# Patient Record
Sex: FEMALE | Race: WHITE
[De-identification: names, ages, dates, MRNs, and addresses within clinical notes are randomized per-mention and may not be internally consistent; named-entity substitution may affect disease eponyms.]

---

## 2017-09-04 ENCOUNTER — HOSPITAL ENCOUNTER (OUTPATIENT)
Dept: HOSPITAL 10 - L-D | Age: 27
Discharge: HOME | End: 2017-09-04
Attending: OBSTETRICS & GYNECOLOGY
Payer: MEDICAID

## 2017-09-04 VITALS
HEIGHT: 61 IN | BODY MASS INDEX: 28.68 KG/M2 | WEIGHT: 151.9 LBS | HEIGHT: 61 IN | BODY MASS INDEX: 28.68 KG/M2 | WEIGHT: 151.9 LBS

## 2017-09-04 VITALS — DIASTOLIC BLOOD PRESSURE: 62 MMHG | HEART RATE: 18 BPM | SYSTOLIC BLOOD PRESSURE: 118 MMHG | RESPIRATION RATE: 18 BRPM

## 2017-09-04 DIAGNOSIS — Z3A.36: ICD-10-CM

## 2017-09-04 DIAGNOSIS — D64.9: ICD-10-CM

## 2017-09-04 DIAGNOSIS — O99.013: Primary | ICD-10-CM

## 2017-09-04 DIAGNOSIS — O26.893: ICD-10-CM

## 2017-09-04 DIAGNOSIS — R51: ICD-10-CM

## 2017-09-04 LAB
ADD UMIC: YES
ALBUMIN SERPL-MCNC: 2.7 G/DL (ref 3.3–4.9)
ALBUMIN/GLOB SERPL: 0.96 {RATIO}
ALP SERPL-CCNC: 153 IU/L (ref 42–121)
ALT SERPL-CCNC: 23 IU/L (ref 13–69)
ANION GAP SERPL CALC-SCNC: 10 MMOL/L (ref 8–16)
APTT BLD: 25.4 SEC (ref 25–35)
AST SERPL-CCNC: 13 IU/L (ref 15–46)
BACTERIA #/AREA URNS HPF: (no result) /HPF
BASOPHILS # BLD AUTO: 0 10^3/UL (ref 0–0.1)
BASOPHILS NFR BLD: 0.7 % (ref 0–2)
BILIRUB DIRECT SERPL-MCNC: 0 MG/DL (ref 0–0.2)
BILIRUB SERPL-MCNC: 0 MG/DL (ref 0.2–1.3)
BUN SERPL-MCNC: 8 MG/DL (ref 7–20)
CALCIUM SERPL-MCNC: 8.2 MG/DL (ref 8.4–10.2)
CHLORIDE SERPL-SCNC: 108 MMOL/L (ref 97–110)
CO2 SERPL-SCNC: 21 MMOL/L (ref 21–31)
COLOR UR: YELLOW
CREAT SERPL-MCNC: 0.47 MG/DL (ref 0.44–1)
EOSINOPHIL # BLD: 0 10^3/UL (ref 0–0.5)
EOSINOPHIL NFR BLD: 0.7 % (ref 0–7)
ERYTHROCYTE [DISTWIDTH] IN BLOOD BY AUTOMATED COUNT: 14.3 % (ref 11.5–14.5)
GLOBULIN SER-MCNC: 2.8 G/DL (ref 1.3–3.2)
GLUCOSE SERPL-MCNC: 76 MG/DL (ref 70–220)
GLUCOSE UR STRIP-MCNC: NEGATIVE MG/DL
HCT VFR BLD CALC: 24.8 % (ref 37–47)
HGB BLD-MCNC: 8 G/DL (ref 12–16)
INR PPP: 0.93
KETONES UR STRIP.AUTO-MCNC: NEGATIVE MG/DL
LYMPHOCYTES # BLD AUTO: 1.7 10^3/UL (ref 0.8–2.9)
LYMPHOCYTES NFR BLD AUTO: 29.2 % (ref 15–51)
MCH RBC QN AUTO: 25.8 PG (ref 29–33)
MCHC RBC AUTO-ENTMCNC: 32.3 G/DL (ref 32–37)
MCV RBC AUTO: 80 FL (ref 82–101)
MONOCYTES # BLD: 0.3 10^3/UL (ref 0.3–0.9)
MONOCYTES NFR BLD: 5.4 % (ref 0–11)
MUCOUS THREADS #/AREA URNS HPF: (no result) /HPF
NEUTROPHILS NFR BLD AUTO: 63.1 % (ref 39–77)
NITRITE UR QL STRIP.AUTO: NEGATIVE MG/DL
NRBC # BLD MANUAL: 0 10^3/UL (ref 0–0)
NRBC BLD AUTO-RTO: 0.5 /100WBC (ref 0–0)
PLATELET # BLD: 200 10^3/UL (ref 140–415)
PMV BLD AUTO: 11.7 FL (ref 7.4–10.4)
POTASSIUM SERPL-SCNC: 3.6 MMOL/L (ref 3.5–5.1)
PROT SERPL-MCNC: 5.5 G/DL (ref 6.1–8.1)
PROTHROMBIN TIME: 12.5 SEC (ref 12.2–14.2)
PT RATIO: 1
RBC # BLD AUTO: 3.1 10^6/UL (ref 4.2–5.4)
RBC # UR AUTO: NEGATIVE MG/DL
SODIUM SERPL-SCNC: 135 MMOL/L (ref 135–144)
SQUAMOUS #/AREA URNS HPF: (no result) /HPF
UR ASCORBIC ACID: NEGATIVE MG/DL
UR BILIRUBIN (DIP): NEGATIVE MG/DL
UR CLARITY: (no result)
UR PH (DIP): 7 (ref 5–9)
UR RBC: 2 /HPF (ref 0–5)
UR SPECIFIC GRAVITY (DIP): 1.01 (ref 1–1.03)
UR TOTAL PROTEIN (DIP): NEGATIVE MG/DL
URATE SERPL-MCNC: 4.3 MG/DL (ref 3.1–7.9)
UROBILINOGEN UR STRIP-ACNC: NEGATIVE MG/DL
WBC # BLD AUTO: 5.8 10^3/UL (ref 4.8–10.8)
WBC # UR STRIP: (no result) LEU/UL

## 2017-09-04 PROCEDURE — 85025 COMPLETE CBC W/AUTO DIFF WBC: CPT

## 2017-09-04 PROCEDURE — 84560 ASSAY OF URINE/URIC ACID: CPT

## 2017-09-04 PROCEDURE — 85610 PROTHROMBIN TIME: CPT

## 2017-09-04 PROCEDURE — 81001 URINALYSIS AUTO W/SCOPE: CPT

## 2017-09-04 PROCEDURE — 85730 THROMBOPLASTIN TIME PARTIAL: CPT

## 2017-09-04 PROCEDURE — 87086 URINE CULTURE/COLONY COUNT: CPT

## 2017-09-04 PROCEDURE — 76818 FETAL BIOPHYS PROFILE W/NST: CPT

## 2017-09-04 PROCEDURE — 80053 COMPREHEN METABOLIC PANEL: CPT

## 2017-09-04 PROCEDURE — G0463 HOSPITAL OUTPT CLINIC VISIT: HCPCS

## 2017-09-04 PROCEDURE — 36415 COLL VENOUS BLD VENIPUNCTURE: CPT

## 2017-09-04 NOTE — TRIAGE
===================================

OB Triage

===================================

Datetime Report Generated by CPN: 09/04/2017 08:40

   

   

===========================

Datetime: 09/04/2017 08:07

===========================

   

   

===================================

Labor Evaluation

===================================

   

 Frequency:  2-10

 Monitor Mode:  External

 Duration (sec)2399:  

 Quality:  Mild

 Pattern:  Normal: <= 5 Contractions in 10 Minutes

 Resting Tone [place]:  Relaxed

 Contraction Comments:  PATIENT DENIES FEELING CONTRACTIONS

   

===================================

Fetal Heart Rate

===================================

   

 FHR Baseline Rate:  130

 Monitor Mode:  External US

 FHR Baseline Changes:  No Baseline Change

 Variability:  Moderate 6-25 bpm

 Accelerations:  15X15

 Decelerations:  None

 Category:  Category I

   

===================================

Pain Assessment

===================================

   

 Pain Scale:  0

 Pain Presence:  None/Denies

 Pain Type:  N/A

   

===========================

Datetime: 09/04/2017 07:30

===========================

   

 Assessment Type:  Triage

   

===================================

Maternal Assessment

===================================

   

 Level of Consciousness:  Fully Conscious

 DTR's/Clonus:  DTRs 2+; No Clonus

 Headache:  Denies

 Blurred Vision:  No

 Respiratory Effort:  Unlabored; Regular Rhythm

 Breath Sounds, Left:  Clear and Equal

 Breath Sounds, Right:  Clear and Equal

 Nausea/Vomiting:  Denies

 RUQ Epigastric Pain:  Denies

 Lower Extremities Edema:  None

     Degree:  None

 Upper Extremities Edema:  None

     Degree:  None

 Facial Edema:  None

   

===================================

Fall Risk Assessment

===================================

   

 History of Falling:  (0) No

 Secondary Diagnosis:  (0) No

 Ambulatory Aid:  (0) Bedrest/Nurse Assist

 IV Therapy:  (0) No

 Gait:  (0) Normal/Bedrest/Immobile

 Mental Status:  (0) Oriented to Own Ability

 Fall Score:  0

 Fall Risk Score Definition:  No Risk: No action required

   

===========================

Datetime: 09/04/2017 07:03

===========================

   

 Stage of Pregnancy:  OB Triage

   

===========================

Datetime: 09/04/2017 06:08

===========================

   

 Stage of Pregnancy:  OB Triage

   

===================================

Vaginal Exam

===================================

   

 Dilatation (cms):  0.5

 Effacement (%):  60

 Station:  -3

 Exam By:  LYLES,AALIYAH 

   

===========================

Datetime: 09/04/2017 06:00

===========================

   

 Stage of Pregnancy:  OB Triage

   

===================================

Maternal Assessment

===================================

   

 Level of Consciousness:  Fully Conscious

   

===================================

Labor Evaluation

===================================

   

 Frequency:  1-7

 Monitor Mode:  External

 Duration (sec)2399:  

 Quality:  Mild

 Pattern:  Normal: <= 5 Contractions in 10 Minutes

   

===================================

Fetal Heart Rate

===================================

   

 FHR Baseline Rate:  130

 Monitor Mode:  External US

 Variability:  Moderate 6-25 bpm

 Accelerations:  15X15

 Decelerations:  None

 Category:  Category I

   

===================================

Pain Assessment

===================================

   

 Pain Scale:  2

 Pain Presence:  Constant

 Pain Type:  Stabbing

 Pain Location:  Head

 Pain Goal:  3

 Pain Relief Measures:  Pain Medication Given

   

===========================

Datetime: 09/04/2017 05:57

===========================

   

 Stage of Pregnancy:  OB Triage

   

===========================

Datetime: 09/04/2017 05:17

===========================

   

 Stage of Pregnancy:  OB Triage

   

===========================

Datetime: 09/04/2017 05:04

===========================

   

 Stage of Pregnancy:  OB Triage

   

===================================

Maternal Assessment

===================================

   

 Level of Consciousness:  Fully Conscious

 DTR's/Clonus:  DTRs 2+; No Clonus

 Headache:  Denies

 Breath Sounds, Left:  Clear and Equal

 Breath Sounds, Right:  Clear and Equal

 Nausea/Vomiting:  Denies

 RUQ Epigastric Pain:  Denies

   

===================================

Labor Evaluation

===================================

   

 Frequency:  1-7

 Monitor Mode:  External

 Duration (sec)2399:  

 Quality:  Mild

 Pattern:  Normal: <= 5 Contractions in 10 Minutes

   

===================================

Fetal Heart Rate

===================================

   

 FHR Baseline Rate:  130

 Monitor Mode:  External US

 Variability:  Moderate 6-25 bpm

 Accelerations:  15X15

 Decelerations:  None

 Category:  Category I

   

===================================

Pain Assessment

===================================

   

 Pain Scale:  8

 Pain Presence:  Constant

 Pain Type:  Stabbing

 Pain Location:  Head

 Pain Goal:  3

   

===========================

Datetime: 09/04/2017 04:45

===========================

   

 Stage of Pregnancy:  OB Triage

   

===========================

Datetime: 09/04/2017 04:37

===========================

   

 Stage of Pregnancy:  OB Triage

   

===================================

Maternal Assessment

===================================

   

 Level of Consciousness:  Fully Conscious

 DTR's/Clonus:  DTRs 2+; No Clonus

 Headache:  Denies

 Blurred Vision:  No

 Respiratory Effort:  Unlabored; Regular Rhythm; Equal Expansion

 Breath Sounds, Left:  Clear and Equal

 Breath Sounds, Right:  Clear and Equal

 Nausea/Vomiting:  Denies

 RUQ Epigastric Pain:  Denies

 Lower Extremities Edema:  None

 Upper Extremities Edema:  Bilateral Upper Extremities

     Degree:  Pitting

 Facial Edema:  None

 Temperature Route:  Oral

   

===================================

Fall Risk Assessment

===================================

   

 History of Falling:  (0) No

 Secondary Diagnosis:  (0) No

 Ambulatory Aid:  (0) Bedrest/Nurse Assist

 IV Therapy:  (0) No

 Gait:  (0) Normal/Bedrest/Immobile

 Mental Status:  (0) Oriented to Own Ability

 Fall Score:  0

 Fall Risk Score Definition:  No Risk: No action required

   

===================================

Labor Evaluation

===================================

   

 Frequency:  X1

 Monitor Mode:  External

 Duration (sec)2399:  130

 Quality:  Mild

   

===================================

Fetal Heart Rate

===================================

   

 FHR Baseline Rate:  130

 Monitor Mode:  External US

 Variability:  Moderate 6-25 bpm

 Accelerations:  15X15

 Decelerations:  None

 Category:  Category I

   

===================================

Pain Assessment

===================================

   

 Pain Scale:  8

 Pain Presence:  Constant

 Pain Type:  Stabbing

 Pain Location:  Head (Annotations: OCCIPITAL HEADACHE )

 Pain Goal:  3

   

===========================

Datetime: 09/04/2017 04:25

===========================

   

 Time of Arrival:  09/04/2017 04:13

 EGA:  36.6

 Arrived By:  Ambulatory

 Arrived From:  Home

 Chief Complaint:  PT C/O HEADACHE 9/10

 Fetal Movement:  Present

 Contractions:  Denies/Absent

 Rupture of Membranes:  Denies

 Vaginal Discharge:  Denies

 Recent Sexual Intercouse:  Denies

 Abdominal Trauma:  Not Applicable

 Patient Complaints:  Headache

## 2017-09-04 NOTE — RADRPT
PROCEDURE:   US OB biophysical profile. 

 

CLINICAL INDICATION:  Biophysical profile assessment and 89-year-old pregnant female.

 

TECHNIQUE:   Multiple sonographic images of the pelvis were obtained.  The images were reviewed on a
 PACS workstation. 

 

COMPARISON:   None 

 

FINDINGS:

 

Presentation: Cephalic.

 

Cardiac activity is present with 150 beats per minute. 

 

The placenta is fundal.  

 

MVP: Not available.

 

Amniotic fluid index: 11.43 cm

 

Biophysical profile:

Fetal movement 2/2

Fetal tone 2/2.

Fetal breathing 2/2 

JOSE 2/2

 

Total 8/8 

 

IMPRESSION:

 

1. Normal biophysical profile.

 

RPTAT:AAJJ .

_____________________________________________ 

Physician Orlando           Date    Time 

Electronically viewed and signed by Physician Orlando on 09/04/2017 08:11 

 

D:  09/04/2017 08:11  T:  09/04/2017 08:11

/

## 2017-09-04 NOTE — PN
Triage Information


Date/Time





Reason for visit:  headache  8/10   swollen hands  for 4days


Weeks of Gestation


36w6d


/Para





Diabetes:  none


Hypertention:  none





Objective





 Vital Signs








  Date Time  Temp Pulse Resp B/P Pulse Ox O2 Delivery O2 Flow Rate FiO2


 


17 04:32 97.8 18 18 118/62  Room Air  








Fetal Heart Rate:  130's


Fetal Heart Rate Comments


EFM   u.c 1-8min apart   not symptomatic


Contractions:  < 5 Minutes Apart


Exam


ftp/60%/-3





Results/Medications


Result Diagram:  


17





Results 24 hrs





Laboratory Tests








Test


  17


04:30 17


06:01


 


Urine Color YELLOW   


 


Urine Clarity


  SLIGHTLY


CLOUDY  A 


 


 


Urine pH 7.0   


 


Urine Specific Gravity 1.013   


 


Urine Ketones NEGATIVE   


 


Urine Nitrite NEGATIVE   


 


Urine Bilirubin NEGATIVE   


 


Urine Urobilinogen NEGATIVE   


 


Urine Leukocyte Esterase 3+  H 


 


Urine Microscopic RBC 2   


 


Urine Microscopic WBC 7  H 


 


Urine Squamous Epithelial


Cells MODERATE  


  


 


 


Urine Bacteria FEW  A 


 


Urine Mucus FEW  A 


 


Urine Hemoglobin NEGATIVE   


 


Urine Glucose NEGATIVE   


 


Urine Total Protein NEGATIVE   


 


White Blood Count  5.8  


 


Red Blood Count  3.10  L


 


Hemoglobin  8.0  L


 


Hematocrit  24.8  L


 


Mean Corpuscular Volume  80.0  L


 


Mean Corpuscular Hemoglobin  25.8  L


 


Mean Corpuscular Hemoglobin


Concent 


  32.3  


 


 


Red Cell Distribution Width  14.3  


 


Platelet Count  200  


 


Mean Platelet Volume  11.7  H


 


Neutrophils %  63.1  


 


Lymphocytes %  29.2  


 


Monocytes %  5.4  


 


Eosinophils %  0.7  


 


Basophils %  0.7  


 


Nucleated Red Blood Cells %  0.5  H


 


Neutrophils # (Manual)  3.7  


 


Lymphocytes #  1.7  


 


Monocytes #  0.3  


 


Eosinophils #  0.0  


 


Basophils #  0.0  


 


Nucleated Red Blood Cells #  0.0  


 


Prothrombin Time  12.5  


 


Prothrombin Time Ratio  1.0  


 


INR International Normalized


Ratio 


  0.93  


 


 


Activated Partial


Thromboplast Time 


  25.4  


 


 


Sodium Level  135  


 


Potassium Level  3.6  


 


Chloride Level  108  


 


Carbon Dioxide Level  21  


 


Anion Gap  10  


 


Blood Urea Nitrogen  8  


 


Creatinine  0.47  


 


Glucose Level  76  


 


Uric Acid  4.3  


 


Calcium Level  8.2  L


 


Total Bilirubin  0.0  L


 


Direct Bilirubin  0.00  


 


Indirect Bilirubin  0.0  


 


Aspartate Amino Transf


(AST/SGOT) 


  13  L


 


 


Alanine Aminotransferase


(ALT/SGPT) 


  23  


 


 


Alkaline Phosphatase  153  H


 


Total Protein  5.5  L


 


Albumin  2.7  L


 


Globulin  2.80  


 


Albumin/Globulin Ratio  0.96  








Medications


tylenol 1000mg for headache


Rx cephalexin 500mg q6hr #28


Imaging Results


BPP     JOSE 11.4


Disposition:  Discharge


Assessment/Plan


IUP 36w6d


headache  resolved


anemia





Plan     Rx cephalexin


             urine culture sent


             Iron supp











JEROME PHAN MD Sep 4, 2017 08:30

## 2018-12-05 ENCOUNTER — HOSPITAL ENCOUNTER (OUTPATIENT)
Age: 28
Discharge: HOME | End: 2018-12-05

## 2018-12-05 ENCOUNTER — HOSPITAL ENCOUNTER (OUTPATIENT)
Dept: HOSPITAL 91 - U/S | Age: 28
Discharge: HOME | End: 2018-12-05
Payer: MEDICAID

## 2018-12-05 DIAGNOSIS — Z3A.00: ICD-10-CM

## 2018-12-05 DIAGNOSIS — O26.90: Primary | ICD-10-CM

## 2018-12-05 PROCEDURE — 76801 OB US < 14 WKS SINGLE FETUS: CPT
